# Patient Record
Sex: FEMALE | Race: WHITE
[De-identification: names, ages, dates, MRNs, and addresses within clinical notes are randomized per-mention and may not be internally consistent; named-entity substitution may affect disease eponyms.]

---

## 2020-01-28 ENCOUNTER — HOSPITAL ENCOUNTER (EMERGENCY)
Dept: HOSPITAL 75 - ER | Age: 20
Discharge: HOME | End: 2020-01-28
Payer: COMMERCIAL

## 2020-01-28 VITALS — WEIGHT: 115.3 LBS | HEIGHT: 63.78 IN | BODY MASS INDEX: 19.93 KG/M2

## 2020-01-28 DIAGNOSIS — N94.0: Primary | ICD-10-CM

## 2020-01-28 LAB
APTT PPP: YELLOW S
BACTERIA #/AREA URNS HPF: NEGATIVE /HPF
BILIRUB UR QL STRIP: (no result)
FIBRINOGEN PPP-MCNC: CLEAR MG/DL
GLUCOSE UR STRIP-MCNC: NEGATIVE MG/DL
KETONES UR QL STRIP: (no result)
LEUKOCYTE ESTERASE UR QL STRIP: NEGATIVE
NITRITE UR QL STRIP: NEGATIVE
PH UR STRIP: 5.5 [PH] (ref 5–9)
PROT UR QL STRIP: NEGATIVE
RBC #/AREA URNS HPF: (no result) /HPF
SP GR UR STRIP: >=1.03 (ref 1.02–1.02)
SQUAMOUS #/AREA URNS HPF: (no result) /HPF
WBC #/AREA URNS HPF: (no result) /HPF

## 2020-01-28 PROCEDURE — 81000 URINALYSIS NONAUTO W/SCOPE: CPT

## 2020-01-28 PROCEDURE — 99282 EMERGENCY DEPT VISIT SF MDM: CPT

## 2020-01-28 PROCEDURE — 84703 CHORIONIC GONADOTROPIN ASSAY: CPT

## 2020-01-28 NOTE — ED GU-FEMALE
General


Chief Complaint:  General Problems/Pain


Stated Complaint:  SEVERE MENSTRUAL CRAMPS


Nursing Triage Note:  


Pt ambulates to [facility] with c/o pelvic pain since midnight tonight. Pt also 


reports some light spotting. PT reports taking advil for the pain without any 


relief. Pt LMP 1/7/20


Source:  patient


Exam Limitations:  no limitations





History of Present Illness


Date Seen by Provider:  Jan 28, 2020


Time Seen by Provider:  03:51


Initial Comments


The patient presents to the ER by private conveyance from home with chief 

complaint she was awoken around midnight with some pain starting on her right 

lower pelvis and radiating to the center of her pelvis. She says now she feels 

it across to her pelvis. She describes as cramping like a menstrual cramp. She 

does not have any discharge or dysuria. She's not having any fevers chills 

nausea vomiting. No history of trauma or abdominal surgeries. She is on birth 

control pills which she has been using for the past 4 years. She got off them 

for 2 months and resumed them two months ago. 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 

primary care doctor in [place] but follows with the student health clinic 

locally. Her last menstrual period started somewhere around 8 February and lasts

between 3-5 days. She said she started having some spotting this morning after 

the pain started. She took 600 mg of ibuprofen at 0200 and said it only modestly

helped. She has no other significant medical or surgical history. She prefers 

company of men and her last sexual intercourse was about 10 months ago. She's 

never been tested for STDs. She denies ever having STD. She is a G0.





Allergies and Home Medications


Allergies


Coded Allergies:  


     No Known Drug Allergies (Unverified , 1/28/20)





Patient Home Medication List


Home Medication List Reviewed:  Yes





Review of Systems


Review of Systems


Constitutional:  No chills, No fever, No malaise


EENTM:  No ear discharge, No ear pain


Respiratory:  No cough, No short of breath


Cardiovascular:  No chest pain, No edema


Gastrointestinal:  No abdominal pain, No constipation, No diarrhea, No nausea, 

No vomiting


Genitourinary:  denies burning, denies discharge, denies dysuria


Pregnant:  No


Musculoskeletal:  No back pain, No joint pain





All Other Systemes Reviewed


Negative Unless Noted:  Yes





Past Medical-Social-Family Hx


Patient Social History


Alcohol Use:  Denies Use


Recreational Drug Use:  No


Smoking Status:  Never a Smoker


2nd Hand Smoke Exposure:  No


Recent Foreign Travel:  No


Contact w/Someone Who Travel:  No


Recent Infectious Disease Expo:  No


Recent Hopitalizations:  No


Physical Abuse:  No


Sexual Abuse:  No


Mistreated:  No


Fear:  No





Seasonal Allergies


Seasonal Allergies:  No





Past Medical History


Surgeries:  Yes


Respiratory:  No


Cardiac:  No


Neurological:  No


Genitourinary:  No


Gastrointestinal:  No


Musculoskeletal:  No


Endocrine:  Yes


Hypothyroidsim


HEENT:  No


Cancer:  No


Psychosocial:  No


Integumentary:  No


Blood Disorders:  No





Physical Exam


Vital Signs





Vital Signs - First Documented








 1/28/20





 03:37


 


Temp 37.5


 


Pulse 76


 


Resp 20


 


B/P (MAP) 123/94


 


Pulse Ox 100


 


O2 Delivery Room Air





Capillary Refill :


Height, Weight, BMI


Height: '"


Weight: lbs. oz. kg; 19.00 BMI


Method:


General Appearance:  WD/WN, mild distress


HEENT:  PERRL/EOMI, pharynx normal


Cardiovascular:  normal peripheral pulses, regular rate, rhythm


Respiratory:  lungs clear, normal breath sounds, no respiratory distress, no 

accessory muscle use


Gastrointestinal:  normal bowel sounds, soft, tenderness (over the suprapubic 

region. No right lower quadrant tenderness or rebound tenderness over McBurney's

point. Negative for Rovsing sign or psoas signs.)


Extremities:  normal inspection, normal capillary refill


Neurologic/Psychiatric:  alert, normal mood/affect, oriented x 3


Skin:  normal color, warm/dry





Progress/Results/Core Measures


Suspected Sepsis


SIRS


Temperature: 


Pulse:  


Respiratory Rate: 


 


Blood Pressure  / 


Mean:





Results/Orders


Lab Results





Laboratory Tests








Test


 1/28/20


03:56 Range/Units


 


 


Urine Color YELLOW   


 


Urine Clarity CLEAR   


 


Urine pH 5.5  5-9  


 


Urine Specific Gravity >=1.030  1.016-1.022  


 


Urine Protein NEGATIVE  NEGATIVE  


 


Urine Glucose (UA) NEGATIVE  NEGATIVE  


 


Urine Ketones 2+ H NEGATIVE  


 


Urine Nitrite NEGATIVE  NEGATIVE  


 


Urine Bilirubin 1+ H NEGATIVE  


 


Urine Urobilinogen 0.2  < = 1.0  MG/DL


 


Urine Leukocyte Esterase NEGATIVE  NEGATIVE  


 


Urine RBC (Auto) 1+ H NEGATIVE  


 


Urine RBC 2-5 H  /HPF


 


Urine WBC NONE   /HPF


 


Urine Squamous Epithelial


Cells 2-5 


  /HPF





 


Urine Crystals NONE   /LPF


 


Urine Bacteria NEGATIVE   /HPF


 


Urine Casts NONE   /LPF


 


Urine Mucus LARGE H  /LPF


 


Urine Culture Indicated NO   








My Orders





Orders - ILIANA VERDUZCO


Ua Culture If Indicated (1/28/20 04:03)


Urine Pregnancy Bedside (1/28/20 04:03)





Vital Signs/I&O











 1/28/20





 03:37


 


Temp 37.5


 


Pulse 76


 


Resp 20


 


B/P (MAP) 123/94


 


Pulse Ox 100


 


O2 Delivery Room Air





Capillary Refill :


Progress Note #1:  


   Time:  04:11


Progress Note


Largely a benign abdominal exam. We'll check some urine and a bedside pregnancy 

was negative. If there is no evidence of a UTI then most likely this is 

mittelschmerz. We'll encourage her to continue NSAIDs and follow up outpatient. 

We have offered to do STD testing and she has declined saying she would rather 

do this with her primary care doctor in the morning.


Progress Note #2:  


   Time:  04:45


Progress Note


Despite no further pain medicines the patient says her pain is getting better. 

Her urinalysis shows a little red blood cells but no other significant findings.

We have offered to set her up for ultrasound but he would like to follow-up with

[facility]. We have encouraged her to return if her pain gets worse, 

persists or is accompanied with a fever.





Departure


Impression





   Primary Impression:  


   Mittelschmerz phenomenon


Disposition:  01 HOME, SELF-CARE


Condition:  Stable





Departure-Patient Inst.


Decision time for Depature:  04:46


Referrals:  


[facility] (PCP/Family)


Primary Care Physician


Patient Instructions:  Painful Ovulation (DC)





Add. Discharge Instructions:  


Please make a follow-up appointment with the [facility] clinic today.


Ibuprofen 800 mg every 8 hours as needed for pain.


Tylenol 1000 mg every 8 hours as needed for pain.


Heating pads.


If this is painful ovulation then it should not last for more than 2-3 days. If 

it persists beyond that then this needs to be further worked up by your primary 

care doctor. If the pain becomes unbearable or is accompanied by other symptoms 

such as fever or painful urination then you need to follow-up sooner. If you 

begin to have heavy bleeding or discharge this would also need to be followed up

sooner.


All discharge instructions reviewed with patient and/or family. Voiced 

understanding.


Work/School Note:  School/Childcare Release,    Date Seen in the Emergency 

Department:  Jan 28, 2020


   Time Dismissed from Emergency Department:  04:15


   Return to School:  Jan 29, 2020


   Restrictions:  No Restrictions


      Work Release Form   Date Seen in the Emergency Department:  Jan 28, 2020


   Return to Work:  Jan 29, 2020


   Restrictions:  No Restrictions











ILIANA VERDUZCO                 Jan 28, 2020 04:09